# Patient Record
Sex: FEMALE | Race: BLACK OR AFRICAN AMERICAN | NOT HISPANIC OR LATINO | Employment: FULL TIME | URBAN - METROPOLITAN AREA
[De-identification: names, ages, dates, MRNs, and addresses within clinical notes are randomized per-mention and may not be internally consistent; named-entity substitution may affect disease eponyms.]

---

## 2022-07-25 ENCOUNTER — ROUTINE PRENATAL (OUTPATIENT)
Dept: OBSTETRICS AND GYNECOLOGY | Facility: CLINIC | Age: 27
End: 2022-07-25
Payer: COMMERCIAL

## 2022-07-25 ENCOUNTER — PROCEDURE VISIT (OUTPATIENT)
Dept: OBSTETRICS AND GYNECOLOGY | Facility: CLINIC | Age: 27
End: 2022-07-25
Payer: COMMERCIAL

## 2022-07-25 VITALS
HEART RATE: 116 BPM | SYSTOLIC BLOOD PRESSURE: 131 MMHG | HEIGHT: 60 IN | WEIGHT: 187 LBS | BODY MASS INDEX: 36.71 KG/M2 | DIASTOLIC BLOOD PRESSURE: 86 MMHG

## 2022-07-25 DIAGNOSIS — Z36.89 ENCOUNTER FOR FETAL ANATOMIC SURVEY: ICD-10-CM

## 2022-07-25 DIAGNOSIS — Z34.82 PRENATAL CARE, SUBSEQUENT PREGNANCY IN SECOND TRIMESTER: Primary | ICD-10-CM

## 2022-07-25 DIAGNOSIS — Z98.891 HISTORY OF CESAREAN SECTION: ICD-10-CM

## 2022-07-25 DIAGNOSIS — N34.2 INFECTIVE URETHRITIS: ICD-10-CM

## 2022-07-25 DIAGNOSIS — Z36.89 ENCOUNTER FOR FETAL ANATOMIC SURVEY: Primary | ICD-10-CM

## 2022-07-25 DIAGNOSIS — O99.012 ANEMIA DURING PREGNANCY IN SECOND TRIMESTER: ICD-10-CM

## 2022-07-25 PROCEDURE — 0500F INITIAL PRENATAL CARE VISIT: CPT | Mod: CPTII,S$GLB,, | Performed by: NURSE PRACTITIONER

## 2022-07-25 PROCEDURE — 0500F PR INITIAL PRENATAL CARE VISIT: ICD-10-PCS | Mod: CPTII,S$GLB,, | Performed by: NURSE PRACTITIONER

## 2022-07-25 PROCEDURE — 76805 US OB/GYN PROCEDURE (VIEWPOINT): ICD-10-PCS | Mod: S$GLB,,, | Performed by: OBSTETRICS & GYNECOLOGY

## 2022-07-25 PROCEDURE — 76805 OB US >/= 14 WKS SNGL FETUS: CPT | Mod: S$GLB,,, | Performed by: OBSTETRICS & GYNECOLOGY

## 2022-07-25 RX ORDER — B-COMPLEX WITH VITAMIN C
1 TABLET ORAL DAILY
COMMUNITY
Start: 2022-07-14

## 2022-07-25 NOTE — PROGRESS NOTES
Subjective:       Patient ID: Lisa Quigley is a 27 y.o.  at 15w0d   Chief Complaint:  Routine Prenatal Visit      History of Present Illness  Presents for initial prenatal visit.  Labs and history were reviewed with the patient today  No complaints  PAP done in [place]      History reviewed. No pertinent past medical history.    Past Surgical History:   Procedure Laterality Date     SECTION      x's 3       OB:    OB History    Para Term  AB Living   4 3 3     2   SAB IAB Ectopic Multiple Live Births           3      # Outcome Date GA Lbr Gary/2nd Weight Sex Delivery Anes PTL Lv   4 Current            3 Term 19 40w0d  3.43 kg (7 lb 9 oz) F CS-LTranv Spinal N CHELSEA   2 Term 17 38w0d  2.892 kg (6 lb 6 oz) M CS-LTranv Spinal N CHELSEA   1 Term 16 39w0d   M CS-LTranv Spinal  ND      Complications: Fetal Intolerance     Gyn: no STD, never had abn pap   Meds:   Current Outpatient Medications:     PRENATAL VITAMIN 27 mg iron- 0.8 mg Tab, Take 1 tablet by mouth once daily., Disp: , Rfl:     All: Review of patient's allergies indicates:  No Known Allergies    SH:   Social History     Tobacco Use    Smoking status: Never Smoker    Smokeless tobacco: Never Used   Substance Use Topics    Alcohol use: Never      FH: family history includes Cancer in her paternal grandfather and paternal grandmother.      Review of Systems  Review of Systems   Constitutional: Negative for chills and fever.   Respiratory: Negative for shortness of breath.    Cardiovascular: Negative for chest pain.   Gastrointestinal: Negative for constipation, diarrhea, nausea and vomiting.   Genitourinary: Negative for dysuria, pelvic pain, vaginal bleeding and vaginal discharge.   Musculoskeletal: Negative for arthralgias, back pain and leg pain.   Integumentary:  Negative for rash, hair changes, breast mass and breast skin changes.   Neurological: Negative for headaches.   Hematological: Negative for  adenopathy.   Psychiatric/Behavioral: Negative for depression. The patient is not nervous/anxious.    Breast: Negative for lump, mass and skin changes        Objective:     Vitals:    22 1142 22 1143   BP: 131/86    Pulse: (!) 116    Weight: 84.8 kg (187 lb)    Height:  5' (1.524 m)       Physical Exam:   Constitutional: She is oriented to person, place, and time. She appears well-developed and well-nourished. No distress.    HENT:   Head: Normocephalic and atraumatic.      Cardiovascular: Normal rate and regular rhythm.  Exam reveals no clubbing, no cyanosis and no edema.     Pulmonary/Chest: Breath sounds normal.        Abdominal: Soft. Bowel sounds are normal. She exhibits distension. There is no abdominal tenderness.                 Neurological: She is alert and oriented to person, place, and time.    Skin: No cyanosis. Nails show no clubbing.    Psychiatric: She has a normal mood and affect. Her behavior is normal.             Assessment:     1. Prenatal care, subsequent pregnancy in second trimester    2. History of  section              Plan:     Prenatal care, subsequent pregnancy in second trimester    History of  section        Follow up in about 4 weeks (around 2022), or if symptoms worsen or fail to improve, for will follow in [place] .     Pain fever bleeding precautions  Encouraged PNV  rtc 4 wks

## 2022-07-25 NOTE — PROGRESS NOTES
New intake and will be followed in [place]  PCS times 3 and planning on a repeat  Labs and US done today  Will fax all records to [place]

## 2022-08-03 DIAGNOSIS — O99.012 ANEMIA AFFECTING PREGNANCY IN SECOND TRIMESTER: ICD-10-CM

## 2022-08-03 DIAGNOSIS — N34.2 INFECTIVE URETHRITIS: Primary | ICD-10-CM

## 2022-08-03 LAB
ABS NRBC COUNT: 0 X 10 3/UL (ref 0–0.01)
ABSOLUTE BASOPHIL: 0.01 X 10 3/UL (ref 0–0.22)
ABSOLUTE EOSINOPHIL: 0.06 X 10 3/UL (ref 0.04–0.54)
ABSOLUTE IMMATURE GRAN: 0.05 X 10 3/UL (ref 0–0.04)
ABSOLUTE LYMPHOCYTE: 2.51 X 10 3/UL (ref 0.86–4.75)
ABSOLUTE MONOCYTE: 0.83 X 10 3/UL (ref 0.22–1.08)
AMORPH URATE CRY URNS QL MICRO: ABNORMAL
ANTIBODY SCREEN: NEGATIVE
BACTERIA #/AREA URNS HPF: ABNORMAL /[HPF]
BASOPHILS NFR BLD: 0.1 % (ref 0.2–1.2)
BILIRUB UR QL STRIP: NEGATIVE
BLOOD GROUPING: NORMAL
BLOOD TYPE (D): POSITIVE
CLARITY UR: ABNORMAL
COLOR UR: YELLOW
EOSINOPHIL NFR BLD: 0.5 % (ref 0.7–7)
EPITHELIAL CELLS: ABNORMAL
GLUCOSE (UA): NEGATIVE MG/DL
HBV SURFACE AG SERPL QL IA: NONREACTIVE
HCT VFR BLD AUTO: 32.7 % (ref 37–47)
HCV IGG SERPL QL IA: NONREACTIVE
HGB BLD-MCNC: 10.1 G/DL (ref 12–16)
HIV 1+2 AB+HIV1 P24 AG SERPL QL IA: NONREACTIVE
HSV1 IGG SER-ACNC: NONREACTIVE
HSV2 IGG SER-ACNC: REACTIVE
IMMATURE GRANULOCYTES: 0.4 % (ref 0–0.5)
KETONES UR QL STRIP: 150 MG/DL
LEUKOCYTE ESTERASE UR QL STRIP: ABNORMAL
LYMPHOCYTES NFR BLD: 19.2 % (ref 19.3–53.1)
MCH RBC QN AUTO: 20.2 PG (ref 27–32)
MCHC RBC AUTO-ENTMCNC: 30.9 G/DL (ref 32–36)
MCV RBC AUTO: 65.3 FL (ref 82–100)
MONOCYTES NFR BLD: 6.4 % (ref 4.7–12.5)
MUCOUS THREADS URNS QL MICRO: NEGATIVE
NEUTROPHILS # BLD AUTO: 9.6 X 10 3/UL (ref 2.15–7.56)
NEUTROPHILS NFR BLD: 73.4 % (ref 34–71.1)
NITRITE UR QL STRIP: POSITIVE
NUCLEATED RED BLOOD CELLS: 0 /100 WBC (ref 0–0.2)
OCCULT BLOOD: ABNORMAL
PH, URINE: 6 (ref 5–7.5)
PLATELET # BLD AUTO: 384 X 10 3/UL (ref 135–400)
PROT UR QL STRIP: 25 MG/DL
RBC # BLD AUTO: 5.01 X 10 6/UL (ref 4.2–5.4)
RBC/HPF: ABNORMAL
RDW-SD: 57.9 FL (ref 37–54)
RUBELLA IGG SCREEN: NORMAL
SP GR UR STRIP: 1.01 (ref 1–1.03)
SYPHILIS TREPONEMAL ANTIBODY: NONREACTIVE
T4, FREE: 1.19 NG/DL (ref 0.93–1.7)
TSH SERPL DL<=0.005 MIU/L-ACNC: 0.02 UIU/ML (ref 0.27–4.2)
URINE CULTURE, ROUTINE: NORMAL
UROBILINOGEN, URINE: 1 E.U./DL (ref 0–1)
WBC # BLD: 13.06 X 10 3/UL (ref 4.3–10.8)
WBC/HPF: ABNORMAL

## 2022-08-03 RX ORDER — FERROUS SULFATE 325(65) MG
325 TABLET, DELAYED RELEASE (ENTERIC COATED) ORAL 2 TIMES DAILY
Qty: 60 TABLET | Refills: 3 | Status: SHIPPED | OUTPATIENT
Start: 2022-08-03 | End: 2022-08-11 | Stop reason: SDUPTHER

## 2022-08-03 RX ORDER — NITROFURANTOIN 25; 75 MG/1; MG/1
100 CAPSULE ORAL 2 TIMES DAILY
Qty: 14 CAPSULE | Refills: 0 | Status: SHIPPED | OUTPATIENT
Start: 2022-08-03 | End: 2022-08-11 | Stop reason: SDUPTHER

## 2022-08-11 ENCOUNTER — TELEPHONE (OUTPATIENT)
Dept: OBSTETRICS AND GYNECOLOGY | Facility: CLINIC | Age: 27
End: 2022-08-11
Payer: COMMERCIAL

## 2022-08-11 DIAGNOSIS — O99.012 ANEMIA AFFECTING PREGNANCY IN SECOND TRIMESTER: ICD-10-CM

## 2022-08-11 DIAGNOSIS — N34.2 INFECTIVE URETHRITIS: ICD-10-CM

## 2022-08-11 PROBLEM — Z98.891 HISTORY OF CESAREAN SECTION: Status: ACTIVE | Noted: 2022-08-11

## 2022-08-11 RX ORDER — FERROUS SULFATE 325(65) MG
325 TABLET, DELAYED RELEASE (ENTERIC COATED) ORAL 2 TIMES DAILY
Qty: 60 TABLET | Refills: 3 | Status: SHIPPED | OUTPATIENT
Start: 2022-08-11

## 2022-08-11 RX ORDER — NITROFURANTOIN 25; 75 MG/1; MG/1
100 CAPSULE ORAL 2 TIMES DAILY
Qty: 14 CAPSULE | Refills: 0 | Status: SHIPPED | OUTPATIENT
Start: 2022-08-11 | End: 2022-08-18

## 2022-08-11 NOTE — TELEPHONE ENCOUNTER
Pt did not  meds for FE and macrobid  Resent and pt was evaluated in [place] today  Doing well  Good FM and no C/O

## 2022-09-08 ENCOUNTER — PROCEDURE VISIT (OUTPATIENT)
Dept: OBSTETRICS AND GYNECOLOGY | Facility: CLINIC | Age: 27
End: 2022-09-08
Payer: COMMERCIAL

## 2022-09-08 ENCOUNTER — ROUTINE PRENATAL (OUTPATIENT)
Dept: OBSTETRICS AND GYNECOLOGY | Facility: CLINIC | Age: 27
End: 2022-09-08
Payer: COMMERCIAL

## 2022-09-08 VITALS
DIASTOLIC BLOOD PRESSURE: 82 MMHG | WEIGHT: 190 LBS | SYSTOLIC BLOOD PRESSURE: 118 MMHG | BODY MASS INDEX: 37.11 KG/M2 | HEART RATE: 103 BPM

## 2022-09-08 DIAGNOSIS — Z36.89 ENCOUNTER FOR FETAL ANATOMIC SURVEY: ICD-10-CM

## 2022-09-08 DIAGNOSIS — Z34.82 PRENATAL CARE, SUBSEQUENT PREGNANCY, SECOND TRIMESTER: Primary | ICD-10-CM

## 2022-09-08 DIAGNOSIS — Z36.89 ENCOUNTER FOR FETAL ANATOMIC SURVEY: Primary | ICD-10-CM

## 2022-09-08 PROCEDURE — 0502F SUBSEQUENT PRENATAL CARE: CPT | Mod: CPTII,S$GLB,, | Performed by: OBSTETRICS & GYNECOLOGY

## 2022-09-08 PROCEDURE — 76805 OB US >/= 14 WKS SNGL FETUS: CPT | Mod: S$GLB,,, | Performed by: OBSTETRICS & GYNECOLOGY

## 2022-09-08 PROCEDURE — 0502F PR SUBSEQUENT PRENATAL CARE: ICD-10-PCS | Mod: CPTII,S$GLB,, | Performed by: OBSTETRICS & GYNECOLOGY

## 2022-09-08 PROCEDURE — 76805 US OB/GYN PROCEDURE (VIEWPOINT): ICD-10-PCS | Mod: S$GLB,,, | Performed by: OBSTETRICS & GYNECOLOGY

## 2022-10-03 DIAGNOSIS — Z36.89 ENCOUNTER FOR ULTRASOUND TO ASSESS FETAL GROWTH: ICD-10-CM

## 2022-10-03 DIAGNOSIS — Z36.2 ENCOUNTER FOR FOLLOW-UP ULTRASOUND OF FETAL ANATOMY: Primary | ICD-10-CM

## 2022-10-06 ENCOUNTER — PROCEDURE VISIT (OUTPATIENT)
Dept: OBSTETRICS AND GYNECOLOGY | Facility: CLINIC | Age: 27
End: 2022-10-06
Payer: COMMERCIAL

## 2022-10-06 ENCOUNTER — ROUTINE PRENATAL (OUTPATIENT)
Dept: OBSTETRICS AND GYNECOLOGY | Facility: CLINIC | Age: 27
End: 2022-10-06
Payer: COMMERCIAL

## 2022-10-06 VITALS
SYSTOLIC BLOOD PRESSURE: 118 MMHG | BODY MASS INDEX: 36.91 KG/M2 | DIASTOLIC BLOOD PRESSURE: 79 MMHG | WEIGHT: 189 LBS | HEART RATE: 105 BPM

## 2022-10-06 DIAGNOSIS — Z34.82 PRENATAL CARE, SUBSEQUENT PREGNANCY, SECOND TRIMESTER: Primary | ICD-10-CM

## 2022-10-06 DIAGNOSIS — Z36.2 ENCOUNTER FOR FOLLOW-UP ULTRASOUND OF FETAL ANATOMY: ICD-10-CM

## 2022-10-06 PROCEDURE — 76816 OB US FOLLOW-UP PER FETUS: CPT | Mod: S$GLB,,, | Performed by: OBSTETRICS & GYNECOLOGY

## 2022-10-06 PROCEDURE — 0502F SUBSEQUENT PRENATAL CARE: CPT | Mod: S$GLB,,, | Performed by: OBSTETRICS & GYNECOLOGY

## 2022-10-06 PROCEDURE — 0502F PR SUBSEQUENT PRENATAL CARE: ICD-10-PCS | Mod: S$GLB,,, | Performed by: OBSTETRICS & GYNECOLOGY

## 2022-10-06 PROCEDURE — 76816 US OB/GYN PROCEDURE (VIEWPOINT): ICD-10-PCS | Mod: S$GLB,,, | Performed by: OBSTETRICS & GYNECOLOGY

## 2022-10-06 NOTE — PROGRESS NOTES
labor precautions discussed.  Glucose screening discussed.  Patient does not report any bleeding spotting or cramping at this time.  She reports active fetal movement.  All questions were answered.

## 2022-11-03 ENCOUNTER — ROUTINE PRENATAL (OUTPATIENT)
Dept: OBSTETRICS AND GYNECOLOGY | Facility: CLINIC | Age: 27
End: 2022-11-03
Payer: COMMERCIAL

## 2022-11-03 VITALS
WEIGHT: 192 LBS | DIASTOLIC BLOOD PRESSURE: 78 MMHG | SYSTOLIC BLOOD PRESSURE: 116 MMHG | HEART RATE: 88 BPM | BODY MASS INDEX: 37.5 KG/M2

## 2022-11-03 DIAGNOSIS — Z34.83 PRENATAL CARE, SUBSEQUENT PREGNANCY, THIRD TRIMESTER: Primary | ICD-10-CM

## 2022-11-03 PROCEDURE — 0502F SUBSEQUENT PRENATAL CARE: CPT | Mod: CPTII,S$GLB,, | Performed by: OBSTETRICS & GYNECOLOGY

## 2022-11-03 PROCEDURE — 0502F PR SUBSEQUENT PRENATAL CARE: ICD-10-PCS | Mod: CPTII,S$GLB,, | Performed by: OBSTETRICS & GYNECOLOGY

## 2022-11-03 NOTE — PROGRESS NOTES
Epistaxis from the right x 1 week  Discussed prn therapy.   Active fetal movement  No contractions  No LOF

## 2022-11-17 ENCOUNTER — ROUTINE PRENATAL (OUTPATIENT)
Dept: OBSTETRICS AND GYNECOLOGY | Facility: CLINIC | Age: 27
End: 2022-11-17
Payer: COMMERCIAL

## 2022-11-17 VITALS
SYSTOLIC BLOOD PRESSURE: 120 MMHG | WEIGHT: 191 LBS | HEART RATE: 96 BPM | DIASTOLIC BLOOD PRESSURE: 80 MMHG | BODY MASS INDEX: 37.3 KG/M2

## 2022-11-17 DIAGNOSIS — Z34.83 PRENATAL CARE, SUBSEQUENT PREGNANCY, THIRD TRIMESTER: Primary | ICD-10-CM

## 2022-11-17 PROCEDURE — 0502F PR SUBSEQUENT PRENATAL CARE: ICD-10-PCS | Mod: CPTII,S$GLB,, | Performed by: OBSTETRICS & GYNECOLOGY

## 2022-11-17 PROCEDURE — 0502F SUBSEQUENT PRENATAL CARE: CPT | Mod: CPTII,S$GLB,, | Performed by: OBSTETRICS & GYNECOLOGY

## 2022-11-17 PROCEDURE — 96372 THER/PROPH/DIAG INJ SC/IM: CPT | Mod: S$GLB,,, | Performed by: OBSTETRICS & GYNECOLOGY

## 2022-11-17 PROCEDURE — 96372 PR INJECTION,THERAP/PROPH/DIAG2ST, IM OR SUBCUT: ICD-10-PCS | Mod: S$GLB,,, | Performed by: OBSTETRICS & GYNECOLOGY

## 2022-11-17 RX ORDER — CEFTRIAXONE 1 G/1
1 INJECTION, POWDER, FOR SOLUTION INTRAMUSCULAR; INTRAVENOUS
Status: COMPLETED | OUTPATIENT
Start: 2022-11-17 | End: 2022-11-17

## 2022-11-17 RX ADMIN — CEFTRIAXONE 1 G: 1 INJECTION, POWDER, FOR SOLUTION INTRAMUSCULAR; INTRAVENOUS at 03:11

## 2022-12-01 ENCOUNTER — ROUTINE PRENATAL (OUTPATIENT)
Dept: OBSTETRICS AND GYNECOLOGY | Facility: CLINIC | Age: 27
End: 2022-12-01
Payer: COMMERCIAL

## 2022-12-01 VITALS
WEIGHT: 196 LBS | HEART RATE: 112 BPM | BODY MASS INDEX: 38.28 KG/M2 | DIASTOLIC BLOOD PRESSURE: 88 MMHG | SYSTOLIC BLOOD PRESSURE: 127 MMHG

## 2022-12-01 DIAGNOSIS — Z34.83 PRENATAL CARE, SUBSEQUENT PREGNANCY, THIRD TRIMESTER: Primary | ICD-10-CM

## 2022-12-01 PROCEDURE — 0502F PR SUBSEQUENT PRENATAL CARE: ICD-10-PCS | Mod: CPTII,S$GLB,, | Performed by: OBSTETRICS & GYNECOLOGY

## 2022-12-01 PROCEDURE — 0502F SUBSEQUENT PRENATAL CARE: CPT | Mod: CPTII,S$GLB,, | Performed by: OBSTETRICS & GYNECOLOGY

## 2022-12-01 NOTE — PROGRESS NOTES
No contractions  Active fetal movement  No lof  No bleeding  Pt c/o pain in umbilicus onset Sunday, no drainage or bleeding noted

## 2022-12-15 ENCOUNTER — PROCEDURE VISIT (OUTPATIENT)
Dept: OBSTETRICS AND GYNECOLOGY | Facility: CLINIC | Age: 27
End: 2022-12-15
Payer: COMMERCIAL

## 2022-12-15 ENCOUNTER — ROUTINE PRENATAL (OUTPATIENT)
Dept: OBSTETRICS AND GYNECOLOGY | Facility: CLINIC | Age: 27
End: 2022-12-15
Payer: COMMERCIAL

## 2022-12-15 VITALS
HEART RATE: 103 BPM | DIASTOLIC BLOOD PRESSURE: 81 MMHG | BODY MASS INDEX: 38.28 KG/M2 | SYSTOLIC BLOOD PRESSURE: 116 MMHG | WEIGHT: 196 LBS

## 2022-12-15 DIAGNOSIS — Z36.89 ENCOUNTER FOR ULTRASOUND TO ASSESS FETAL GROWTH: ICD-10-CM

## 2022-12-15 DIAGNOSIS — Z34.83 PRENATAL CARE, SUBSEQUENT PREGNANCY, THIRD TRIMESTER: Primary | ICD-10-CM

## 2022-12-15 PROCEDURE — 0502F PR SUBSEQUENT PRENATAL CARE: ICD-10-PCS | Mod: S$GLB,,, | Performed by: OBSTETRICS & GYNECOLOGY

## 2022-12-15 PROCEDURE — 76816 US OB/GYN PROCEDURE (VIEWPOINT): ICD-10-PCS | Mod: S$GLB,,, | Performed by: OBSTETRICS & GYNECOLOGY

## 2022-12-15 PROCEDURE — 76816 OB US FOLLOW-UP PER FETUS: CPT | Mod: S$GLB,,, | Performed by: OBSTETRICS & GYNECOLOGY

## 2022-12-15 PROCEDURE — 0502F SUBSEQUENT PRENATAL CARE: CPT | Mod: S$GLB,,, | Performed by: OBSTETRICS & GYNECOLOGY

## 2022-12-15 NOTE — PROGRESS NOTES
Plan on RCD at 1/9/22.   PTL precautions.  Plan GBS next visit  Wants BTL  No contractions, No LOF or bleeding.

## 2022-12-21 ENCOUNTER — PATIENT MESSAGE (OUTPATIENT)
Dept: OBSTETRICS AND GYNECOLOGY | Facility: CLINIC | Age: 27
End: 2022-12-21
Payer: COMMERCIAL

## 2022-12-29 ENCOUNTER — ROUTINE PRENATAL (OUTPATIENT)
Dept: OBSTETRICS AND GYNECOLOGY | Facility: CLINIC | Age: 27
End: 2022-12-29
Payer: COMMERCIAL

## 2022-12-29 VITALS
HEART RATE: 99 BPM | SYSTOLIC BLOOD PRESSURE: 123 MMHG | BODY MASS INDEX: 39.06 KG/M2 | DIASTOLIC BLOOD PRESSURE: 82 MMHG | WEIGHT: 200 LBS

## 2022-12-29 DIAGNOSIS — Z34.83 PRENATAL CARE, SUBSEQUENT PREGNANCY, THIRD TRIMESTER: Primary | ICD-10-CM

## 2022-12-29 PROCEDURE — 0502F PR SUBSEQUENT PRENATAL CARE: ICD-10-PCS | Mod: CPTII,S$GLB,, | Performed by: OBSTETRICS & GYNECOLOGY

## 2022-12-29 PROCEDURE — 0502F SUBSEQUENT PRENATAL CARE: CPT | Mod: CPTII,S$GLB,, | Performed by: OBSTETRICS & GYNECOLOGY

## 2023-01-05 ENCOUNTER — ROUTINE PRENATAL (OUTPATIENT)
Dept: OBSTETRICS AND GYNECOLOGY | Facility: CLINIC | Age: 28
End: 2023-01-05
Payer: COMMERCIAL

## 2023-01-05 VITALS
WEIGHT: 201 LBS | HEART RATE: 98 BPM | BODY MASS INDEX: 39.26 KG/M2 | DIASTOLIC BLOOD PRESSURE: 84 MMHG | SYSTOLIC BLOOD PRESSURE: 127 MMHG

## 2023-01-05 DIAGNOSIS — Z34.83 PRENATAL CARE, SUBSEQUENT PREGNANCY, THIRD TRIMESTER: Primary | ICD-10-CM

## 2023-01-05 PROCEDURE — 0502F SUBSEQUENT PRENATAL CARE: CPT | Mod: CPTII,S$GLB,, | Performed by: OBSTETRICS & GYNECOLOGY

## 2023-01-05 PROCEDURE — 0502F PR SUBSEQUENT PRENATAL CARE: ICD-10-PCS | Mod: CPTII,S$GLB,, | Performed by: OBSTETRICS & GYNECOLOGY

## 2023-01-09 LAB
ERYTHROCYTE [DISTWIDTH] IN BLOOD BY AUTOMATED COUNT: 15.7 % (ref 12.5–18)
HCT VFR BLD AUTO: 36.1 % (ref 37–47)
HGB BLD-MCNC: 12 G/DL (ref 12–16)
MCH RBC QN AUTO: 25.8 PG (ref 27–31.2)
MCHC RBC AUTO-ENTMCNC: 33.2 G/DL (ref 31.8–35.4)
MCV RBC AUTO: 77.5 FL (ref 80–97)
NUCLEATED RED BLOOD CELLS: 0 %
PLATELETS: 230 10*3/UL (ref 142–424)
RBC # BLD AUTO: 4.66 10*6/UL (ref 4.2–5.4)
RPR: NON REACTIVE
WBC # BLD: 10.7 10*3/UL (ref 4.6–10.2)

## 2023-01-10 ENCOUNTER — OUTSIDE PLACE OF SERVICE (OUTPATIENT)
Dept: OBSTETRICS AND GYNECOLOGY | Facility: CLINIC | Age: 28
End: 2023-01-10
Payer: COMMERCIAL

## 2023-01-10 LAB
ERYTHROCYTE [DISTWIDTH] IN BLOOD BY AUTOMATED COUNT: 15.5 % (ref 12.5–18)
HCT VFR BLD AUTO: 34.5 % (ref 37–47)
HGB BLD-MCNC: 11.3 G/DL (ref 12–16)
MCH RBC QN AUTO: 25.5 PG (ref 27–31.2)
MCHC RBC AUTO-ENTMCNC: 32.8 G/DL (ref 31.8–35.4)
MCV RBC AUTO: 77.7 FL (ref 80–97)
NUCLEATED RED BLOOD CELLS: 0 %
PLATELETS: 230 10*3/UL (ref 142–424)
RBC # BLD AUTO: 4.44 10*6/UL (ref 4.2–5.4)
SPECIMEN TO PATHOLOGY: NORMAL
WBC # BLD: 14.1 10*3/UL (ref 4.6–10.2)

## 2023-01-10 PROCEDURE — 59510 CESAREAN DELIVERY: CPT | Mod: GB,,, | Performed by: OBSTETRICS & GYNECOLOGY

## 2023-01-10 PROCEDURE — 58611 LIGATE OVIDUCT(S) ADD-ON: CPT | Mod: ,,, | Performed by: OBSTETRICS & GYNECOLOGY

## 2023-01-10 PROCEDURE — 59510 PR FULL ROUT OBSTE CARE,CESAREAN DELIV: ICD-10-PCS | Mod: GB,,, | Performed by: OBSTETRICS & GYNECOLOGY

## 2023-01-10 PROCEDURE — 58611 PR LIGATION,FALLOPIAN TUBE W/C-SECTION: ICD-10-PCS | Mod: ,,, | Performed by: OBSTETRICS & GYNECOLOGY

## 2023-01-17 ENCOUNTER — POSTPARTUM VISIT (OUTPATIENT)
Dept: OBSTETRICS AND GYNECOLOGY | Facility: CLINIC | Age: 28
End: 2023-01-17
Payer: COMMERCIAL

## 2023-01-17 VITALS
SYSTOLIC BLOOD PRESSURE: 139 MMHG | BODY MASS INDEX: 36.52 KG/M2 | HEART RATE: 67 BPM | DIASTOLIC BLOOD PRESSURE: 89 MMHG | WEIGHT: 186 LBS | HEIGHT: 60 IN

## 2023-01-17 DIAGNOSIS — Z48.89 POSTOPERATIVE VISIT: Primary | ICD-10-CM

## 2023-01-17 PROCEDURE — 99024 PR POST-OP FOLLOW-UP VISIT: ICD-10-PCS | Mod: S$GLB,,, | Performed by: OBSTETRICS & GYNECOLOGY

## 2023-01-17 PROCEDURE — 99024 POSTOP FOLLOW-UP VISIT: CPT | Mod: S$GLB,,, | Performed by: OBSTETRICS & GYNECOLOGY

## 2023-01-17 NOTE — PROGRESS NOTES
Subjective:       Patient ID: Lisa Quigley is a 27 y.o. female.    Chief Complaint:  Postpartum Care (Pt acknowledged scant vaginal bleeding, pt is currently bf'ing, pt denies any issues with PPD /)      History of Present Illness  HPI  Patient presents for postoperative incision check.    GYN & OB History  No LMP recorded.   Date of Last Pap: No result found    OB History    Para Term  AB Living   4 3 3     2   SAB IAB Ectopic Multiple Live Births           3      # Outcome Date GA Lbr Gary/2nd Weight Sex Delivery Anes PTL Lv   4             3 Term 19 40w0d  3.43 kg (7 lb 9 oz) F CS-LTranv Spinal N CHELSEA   2 Term 17 38w0d  2.892 kg (6 lb 6 oz) M CS-LTranv Spinal N CHELSEA   1 Term 16 39w0d   M CS-LTranv Spinal  ND      Complications: Fetal Intolerance       Review of Systems  Review of Systems   Patient is doing well     Objective:    Physical Exam     Incision clean dry intact.  Some granulation tissue noted and silver nitrate applied.  Assessment:      No diagnosis found.   Postoperative visit         Plan:      Incision healing well

## 2023-02-23 ENCOUNTER — POSTPARTUM VISIT (OUTPATIENT)
Dept: OBSTETRICS AND GYNECOLOGY | Facility: CLINIC | Age: 28
End: 2023-02-23
Payer: COMMERCIAL

## 2023-02-23 VITALS
HEART RATE: 83 BPM | WEIGHT: 182 LBS | HEIGHT: 60 IN | DIASTOLIC BLOOD PRESSURE: 92 MMHG | BODY MASS INDEX: 35.73 KG/M2 | SYSTOLIC BLOOD PRESSURE: 133 MMHG

## 2023-02-23 PROCEDURE — 0503F POSTPARTUM CARE VISIT: CPT | Mod: S$GLB,,, | Performed by: OBSTETRICS & GYNECOLOGY

## 2023-02-23 PROCEDURE — 0503F PR POSTPARTUM CARE VISIT: ICD-10-PCS | Mod: S$GLB,,, | Performed by: OBSTETRICS & GYNECOLOGY

## 2023-02-23 RX ORDER — CITALOPRAM 20 MG/1
20 TABLET, FILM COATED ORAL DAILY
Qty: 30 TABLET | Refills: 11 | Status: SHIPPED | OUTPATIENT
Start: 2023-02-23 | End: 2024-02-23

## 2023-02-23 NOTE — PROGRESS NOTES
Subjective:       Patient ID: Lisa Quigley is a 27 y.o. female.    Chief Complaint:  Postpartum Care (Pt recently had a cycle, pt is currently bf'ing, pt has not resumed sex. Pt acknowledged some issue with Ppd /)      History of Present Illness  HPI  Having some depressive feelings.     GYN & OB History  No LMP recorded.   Date of Last Pap: No result found    OB History    Para Term  AB Living   4 4 4     3   SAB IAB Ectopic Multiple Live Births           4      # Outcome Date GA Lbr Gary/2nd Weight Sex Delivery Anes PTL Lv   4 Term 23 39w0d  3.374 kg (7 lb 7 oz) F CS-LTranv Spinal  CHELSEA   3 Term 19 40w0d  3.43 kg (7 lb 9 oz) F CS-LTranv Spinal N CHELSEA   2 Term 17 38w0d  2.892 kg (6 lb 6 oz) M CS-LTranv Spinal N CHELSEA   1 Term 16 39w0d   M CS-LTranv Spinal  ND      Complications: Fetal Intolerance       Review of Systems  Review of Systems   Constitutional:  Negative for activity change, appetite change, chills, diaphoresis, fatigue, fever and unexpected weight change.   Respiratory:  Negative for cough and shortness of breath.    Cardiovascular:  Negative for chest pain, palpitations and leg swelling.   Gastrointestinal:  Negative for abdominal pain, bloating, constipation and diarrhea.   Genitourinary:  Negative for vaginal bleeding, vaginal discharge, vaginal pain, vaginal dryness and vaginal odor.   Musculoskeletal:  Negative for back pain and joint swelling.   Integumentary:  Negative for rash and acne.   Psychiatric/Behavioral:  Negative for depression. The patient is not nervous/anxious.          Objective:    Physical Exam:   Constitutional: She is oriented to person, place, and time. Vital signs are normal. She appears well-developed and well-nourished. She is cooperative.      Neck: No thyroid mass and no thyromegaly present.    Cardiovascular:  Normal rate and normal pulses.             Pulmonary/Chest: Effort normal. No respiratory distress.        Abdominal: Soft.  She exhibits no distension. There is no abdominal tenderness. No hernia.   Incision c/d/i     Genitourinary:    Genitourinary Comments: Normal bimanual exam             Musculoskeletal: Moves all extremeties.       Neurological: She is alert and oriented to person, place, and time.    Skin: Skin is warm and dry. No rash noted.    Psychiatric: She has a normal mood and affect. Her speech is normal and behavior is normal. Judgment and thought content normal.        Assessment:        1. Postpartum depression       Postpartum Visit         Plan:      Plan return to work 03/13